# Patient Record
Sex: MALE | Race: WHITE | Employment: OTHER | URBAN - METROPOLITAN AREA
[De-identification: names, ages, dates, MRNs, and addresses within clinical notes are randomized per-mention and may not be internally consistent; named-entity substitution may affect disease eponyms.]

---

## 2017-04-03 ENCOUNTER — IP HISTORICAL/CONVERTED ENCOUNTER (OUTPATIENT)
Dept: OTHER | Age: 82
End: 2017-04-03

## 2017-04-22 ENCOUNTER — OP HISTORICAL/CONVERTED ENCOUNTER (OUTPATIENT)
Dept: OTHER | Age: 82
End: 2017-04-22

## 2017-05-31 ENCOUNTER — TELEPHONE (OUTPATIENT)
Dept: ENDOCRINOLOGY | Age: 82
End: 2017-05-31

## 2017-05-31 NOTE — TELEPHONE ENCOUNTER
Spoke with patient's wife, Merrill Ralph (HIPPA verified). She stated patient will have colonoscopy on 06/19/2017 and was instructed to call diabetic prescribing physician to find out dosing instructions for medications before beginning a clear liquid diet. Please advise.

## 2017-06-01 NOTE — TELEPHONE ENCOUNTER
She is not on any meds that could cause low sugars    But he could stop both [place] and [place] and metformin the day before colonoscopy and day of procedure   Start next day back Luann

## 2017-06-13 ENCOUNTER — TELEPHONE (OUTPATIENT)
Dept: ENDOCRINOLOGY | Age: 82
End: 2017-06-13

## 2017-06-13 NOTE — TELEPHONE ENCOUNTER
Patients wife would like to clarify directions on medications stop and start for his procedure on Monday the 19th of June. Please leave a message if she does not .  Thank you

## 2017-06-13 NOTE — TELEPHONE ENCOUNTER
Spoke with patient's wife and told her to stop the diabetic medication the day prior to procedure and the day of procedure and resume the day after procedure. Verbalized understanding. No further questions noted at this time.

## 2017-06-15 NOTE — H&P
Patient Name: Robert Macedo   Account #: [id number]    Gender: Male    (age): 1935 ([de-identified])       Provider:     Reyna Pond MD        Referring Physician:          Chief Complaint: anemia           History of Present Illness: The patient's laboratory evaluation thus far has included BMP. Results were normal creatinine. The patient's laboratory evaluation thus far has included ferritin. Results were abnormal; 19. Robert Macedo is seen today for a follow-up visit. The patient is seen for evaluation of anemia. Initially diagnosed with anemia 4 weeks ago. Potential reasons already identified include iron deficiency. Hemoccult testing has been not completed. Blood has not been noted in the stool. The patient has required oral iron tablets for replacement therapy. Associated symptoms include none easy fatigability. Has had prior no prior gastric surgeries. The patient's laboratory evaluation thus far has included hemoglobin. Results were 7.5. Past Medical History      Medical Conditions: Anemia  Arthritis  Congestive Heart Failure  Diabetes Mellitus  High blood pressure  Name of blood thinner:____aspirin ___   Surgical Procedures:  Other major surgeries:, HERNIA REPAIR/ BOTH KNEE REPLACEMENTS/SHOULDER   Dx Studies: Abdominal U/S  CAT Scan, 2 yrs ago  Colonoscopy, 8 yrs ago  Pre-Procedure Call, 2016   Medications: Adult Low Dose Aspirin 81 mg  carbidopa-levodopa  mg  clonidine HCl 0.1 mg  finasteride 5 mg  hydrocodone-acetaminophen 5-325 mg  losartan 50 mg  metformin 500 mg  omeprazole 20 mg Take 1 capsule by mouth twice a day before meals for 30 days  polyethylene glycol 3350 NF 17 gram/dose Take 17 gram by mouth twice a day with 12 oz glass of water for 30 days  potassium chloride 10 mEq  ropinirole 5 mg  tamsulosin 0.4 mg  tramadol 50 mg  Tums 200 mg calcium (500 mg)   Allergies: Patient has no known allergies or drug allergies   Immunizations: Flu vaccine, 10/2012  zoster, 10/2011  Pneumococcal conjugate PCV 13, 5 yrs ago      Social History      Alcohol: None   Tobacco: Never smoker   Drugs: None   Exercise: [employer]. Caffeine: 2-3 cups. Coffee or Tea # of cups per day:           Family History       Review of Systems:   Cardiovascular: Presents suffers from peripheral edema. Denies chest pain, irregular heart beat, palpitations, syncope, Sweats. Constitutional: Denies fatigue, fever, loss of appetite, weight loss. ENMT: Denies nose bleeds, sore throat. Endocrine: Denies excessive thirst, heat intolerance. Gastrointestinal: Denies abdominal pain, abdominal swelling, change in bowel habits, constipation, diarrhea, Bloating/gas, heartburn, jaundice, nausea, rectal bleeding, stomach cramps, vomiting, dysphagia, rectal pain, Stool incontinence. Genitourinary: Denies dark urine, dysuria, frequent urination, hematuria, incontinence. Hematologic/Lymphatic: Denies easy bruising, prolonged bleeding. Integumentary: Denies itching, rashes, sun sensitivity. Musculoskeletal: Denies arthritis, back pain, gout, joint pain, muscle weakness, stiffness. Neurological: Denies dizziness, fainting, frequent headaches, memory loss. Psychiatric: Denies anxiety, depression, difficulty sleeping, hallucinations, nervousness, panic attacks, paranoia. Respiratory: Presents suffers from shortness of breath with exercise. Denies cough, wheezing. Vital Signs: See RN notes      Physical Exam:   Constitutional:      Appearance: No distress, appears comfortable. Skin:      Inspection: No rash, no jaundice. Head/face: Inspection: Normacephalic, atraumatic. Eyes:      Conjunctivae/lids: Normal.   ENMT:      External: Normal.   Neck:      Neck: Normal appearance, trachea midline. Respiratory:      Effort: Normal respiratory effort, comfortable, speaks in complete sentences. Auscultation: normal breath sounds, no rubs, wheezes or rhonchi.    Cardiovascular: Auscultation: normal, S1 and S2, no gallops , no rubs or murmurs . Gastrointestinal/Abdomen:      Abdomen: non-distended, nontender. Liver/Spleen: normal, normal size, Liver size and consistency normal, spleen is non-palpable. Musculoskeletal:      Gait/station: normal.   Muscles: normal strength and tone, no atrophy or abnormal movements. Psychiatric:      Judgment/insight: Normal, normal judgement, normal insight. Mood and affect: Normal mood, affect full, no evidence of depression, anxiety or agitation. Lymphatic:      Neck: No lymphadenopathy in the cervical or supraclavicular chain. Lab Results:      Test 8/25/2016 Units Limits   Ferritin, Serum      Ferritin, Serum 25 ng/mL 30 - 400   Vitamin B12      Vitamin B12 432 pg/mL 211 - 946     Impressions: Iron deficiency anemia, unspecified     Plan: I've discussed colonoscopy possible biopsy, polypectomy, cautery, injection, alternatives, complications including but not limited to pain, cardiopulmonary event, bleeding, perforation requiring additional blood transfusion or operative repair; all questions answered.

## 2017-06-19 ENCOUNTER — HOSPITAL ENCOUNTER (OUTPATIENT)
Age: 82
Setting detail: OUTPATIENT SURGERY
Discharge: HOME | End: 2017-06-19
Attending: INTERNAL MEDICINE | Admitting: INTERNAL MEDICINE
Payer: MEDICARE

## 2017-06-19 ENCOUNTER — ANESTHESIA EVENT (OUTPATIENT)
Dept: ENDOSCOPY | Age: 82
End: 2017-06-19
Payer: MEDICARE

## 2017-06-19 ENCOUNTER — ANESTHESIA (OUTPATIENT)
Dept: ENDOSCOPY | Age: 82
End: 2017-06-19
Payer: MEDICARE

## 2017-06-19 VITALS
DIASTOLIC BLOOD PRESSURE: 81 MMHG | WEIGHT: 140 LBS | BODY MASS INDEX: 23.9 KG/M2 | HEART RATE: 62 BPM | HEIGHT: 64 IN | OXYGEN SATURATION: 100 % | RESPIRATION RATE: 17 BRPM | TEMPERATURE: 97.8 F | SYSTOLIC BLOOD PRESSURE: 163 MMHG

## 2017-06-19 LAB
GLUCOSE BLD STRIP.AUTO-MCNC: 123 MG/DL (ref 65–100)
SERVICE CMNT-IMP: ABNORMAL

## 2017-06-19 PROCEDURE — 74011250636 HC RX REV CODE- 250/636

## 2017-06-19 PROCEDURE — 76040000019: Performed by: INTERNAL MEDICINE

## 2017-06-19 PROCEDURE — 74011250636 HC RX REV CODE- 250/636: Performed by: INTERNAL MEDICINE

## 2017-06-19 PROCEDURE — 76060000031 HC ANESTHESIA FIRST 0.5 HR: Performed by: INTERNAL MEDICINE

## 2017-06-19 PROCEDURE — 82962 GLUCOSE BLOOD TEST: CPT

## 2017-06-19 RX ORDER — DEXTROMETHORPHAN/PSEUDOEPHED 2.5-7.5/.8
1.2 DROPS ORAL
Status: DISCONTINUED | OUTPATIENT
Start: 2017-06-19 | End: 2017-06-19 | Stop reason: HOSPADM

## 2017-06-19 RX ORDER — EPINEPHRINE 0.1 MG/ML
1 INJECTION INTRACARDIAC; INTRAVENOUS
Status: DISCONTINUED | OUTPATIENT
Start: 2017-06-19 | End: 2017-06-19 | Stop reason: HOSPADM

## 2017-06-19 RX ORDER — MIDAZOLAM HYDROCHLORIDE 1 MG/ML
.25-5 INJECTION, SOLUTION INTRAMUSCULAR; INTRAVENOUS
Status: DISCONTINUED | OUTPATIENT
Start: 2017-06-19 | End: 2017-06-19 | Stop reason: HOSPADM

## 2017-06-19 RX ORDER — FLUMAZENIL 0.1 MG/ML
0.2 INJECTION INTRAVENOUS
Status: DISCONTINUED | OUTPATIENT
Start: 2017-06-19 | End: 2017-06-19 | Stop reason: HOSPADM

## 2017-06-19 RX ORDER — PROPOFOL 10 MG/ML
INJECTION, EMULSION INTRAVENOUS
Status: DISCONTINUED | OUTPATIENT
Start: 2017-06-19 | End: 2017-06-19 | Stop reason: HOSPADM

## 2017-06-19 RX ORDER — PROPOFOL 10 MG/ML
INJECTION, EMULSION INTRAVENOUS AS NEEDED
Status: DISCONTINUED | OUTPATIENT
Start: 2017-06-19 | End: 2017-06-19 | Stop reason: HOSPADM

## 2017-06-19 RX ORDER — FERROUS GLUCONATE 324(38)MG
TABLET ORAL
COMMUNITY

## 2017-06-19 RX ORDER — FENTANYL CITRATE 50 UG/ML
100 INJECTION, SOLUTION INTRAMUSCULAR; INTRAVENOUS
Status: DISCONTINUED | OUTPATIENT
Start: 2017-06-19 | End: 2017-06-19 | Stop reason: HOSPADM

## 2017-06-19 RX ORDER — NALOXONE HYDROCHLORIDE 0.4 MG/ML
0.4 INJECTION, SOLUTION INTRAMUSCULAR; INTRAVENOUS; SUBCUTANEOUS
Status: DISCONTINUED | OUTPATIENT
Start: 2017-06-19 | End: 2017-06-19 | Stop reason: HOSPADM

## 2017-06-19 RX ORDER — SODIUM CHLORIDE 9 MG/ML
50 INJECTION, SOLUTION INTRAVENOUS CONTINUOUS
Status: DISCONTINUED | OUTPATIENT
Start: 2017-06-19 | End: 2017-06-19 | Stop reason: HOSPADM

## 2017-06-19 RX ORDER — ATROPINE SULFATE 0.1 MG/ML
0.5 INJECTION INTRAVENOUS
Status: DISCONTINUED | OUTPATIENT
Start: 2017-06-19 | End: 2017-06-19 | Stop reason: HOSPADM

## 2017-06-19 RX ADMIN — SODIUM CHLORIDE: 900 INJECTION, SOLUTION INTRAVENOUS at 09:08

## 2017-06-19 RX ADMIN — PROPOFOL 100 MCG/KG/MIN: 10 INJECTION, EMULSION INTRAVENOUS at 09:18

## 2017-06-19 RX ADMIN — PROPOFOL 50 MG: 10 INJECTION, EMULSION INTRAVENOUS at 09:18

## 2017-06-19 NOTE — ANESTHESIA PREPROCEDURE EVALUATION
Anesthetic History   No history of anesthetic complications       Comments: Low BP with epidural     Review of Systems / Medical History  Patient summary reviewed, nursing notes reviewed and pertinent labs reviewed    Pulmonary  Within defined limits                 Neuro/Psych   Within defined limits          Comments: Left side weak   parkinson Cardiovascular    Hypertension      CHF        Exercise tolerance: >4 METS  Comments:        GI/Hepatic/Renal     GERD: well controlled           Endo/Other    Diabetes: well controlled, type 2    Arthritis     Other Findings              Physical Exam    Airway  Mallampati: II    Neck ROM: normal range of motion   Mouth opening: Normal     Cardiovascular  Regular rate and rhythm,  S1 and S2 normal,  no murmur, click, rub, or gallop  Rhythm: regular  Rate: normal         Dental    Dentition: Lower partial plate     Pulmonary  Breath sounds clear to auscultation               Abdominal         Other Findings            Anesthetic Plan    ASA: 3  Anesthesia type: MAC            Anesthetic plan and risks discussed with: Patient      Informed consent obtained.

## 2017-06-19 NOTE — ANESTHESIA POSTPROCEDURE EVALUATION
Post-Anesthesia Evaluation and Assessment    Patient: Neeru Angela MRN: [id number]  SSN: [id number]    YOB: 1935  Age: 81 y.o. Sex: male       Cardiovascular Function/Vital Signs  Visit Vitals    /84    Pulse 63    Resp 13    Ht 5' 4\" (1.626 m)    Wt 63.5 kg (140 lb)    SpO2 100%    BMI 24.03 kg/m2       Patient is status post MAC anesthesia for Procedure(s):  COLONOSCOPY. Nausea/Vomiting: None    Postoperative hydration reviewed and adequate. Pain:  Pain Scale 1: Visual (06/19/17 0945)  Pain Intensity 1: 0 (06/19/17 0945)   Managed    Neurological Status: At baseline    Mental Status and Level of Consciousness: Arousable    Pulmonary Status:   O2 Device: Room air (06/19/17 0945)   Adequate oxygenation and airway patent    Complications related to anesthesia: None    Post-anesthesia assessment completed.  No concerns    Signed By: Sylvia Ocampo DO     June 19, 2017

## 2017-06-19 NOTE — PERIOP NOTES
Pt resting comfortably on stretcher HOB up VSS call bell within reach wife at bedside awaiting MD for procedure will continue to monitor pt.
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
## 2017-06-19 NOTE — DISCHARGE INSTRUCTIONS
Mercedez Knowles  [id number]  1935    DISCHARGE INSTRUCTIONS  Discomfort:  Redness at IV site- apply warm compress to area; if redness or soreness persist- contact your physician. There may be a slight amount of blood passed from the rectum. Gaseous discomfort - walking, belching will help relieve any discomfort. You may not operate a vehicle for 12 hours. You may not engage in an occupation involving machinery or appliances for rest of today. You may not drink alcoholic beverages for at least 12 hours. Avoid making any critical decisions for at least 24 hours. DIET:   High fiber diet. - however -  remember your colon is empty and a heavy meal will produce gas. Avoid these foods:  vegetables, fried / greasy foods, carbonated drinks for today. ACTIVITY:  You may resume your normal daily activities it is recommended that you spend the remainder of the day resting -  avoid any strenuous activity. CALL M.D. ANY SIGN OF:   Increasing pain, nausea, vomiting  Abdominal distension (swelling)  New increased bleeding (oral or rectal)  Fever (chills)  Pain in chest area  Bloody discharge from nose or mouth  Shortness of breath     Follow-up Instructions:  Call Dr. Tommie Lewis if you have any questions or problems.   Follow up blood testing for anemia next week        DISCHARGE SUMMARY from Nurse    The following personal items collected during your admission are returned to you:   Dental Appliance: Dental Appliances: None  Vision: Visual Aid: None  Hearing Aid:    Jewelry:    Clothing:    Other Valuables:    Valuables sent to safe:

## 2017-06-19 NOTE — PROCEDURES
[address]  [phone]      2017    Colonoscopy Procedure Note  Radha Iniguez  :  1935  [place] Medical Record Number: [id number]    Indications:     Iron deficiency anemia  PCP:  Inna Navarrete MD  Anesthesia/Sedation: Conscious Sedation/Moderate Sedation  Endoscopist:  Dr. David Jarrell  Complications:  None  Estimate Blood Loss:  None    Permit:  The indications, risks, benefits and alternatives were reviewed with the patient or their decision maker who was provided an opportunity to ask questions and all questions were answered. The specific risks of colonoscopy with conscious sedation were reviewed, including but not limited to anesthetic complication, bleeding, adverse drug reaction, missed lesion, infection, IV site reactions, and intestinal perforation which would lead to the need for surgical repair. Alternatives to colonoscopy including radiographic imaging, observation without testing, or laboratory testing were reviewed including the limitations of those alternatives. After considering the options and having all their questions answered, the patient or their decision maker provided both verbal and written consent to proceed. Procedure in Detail:  After obtaining informed consent, positioning of the patient in the left lateral decubitus position, and conduction of a pre-procedure pause or \"time out\" the endoscope was introduced into the anus and advanced to the cecum, which was identified by the ileocecal valve and appendiceal orifice. The quality of the colonic preparation was inadequate with significant areas obscured by mud consistency residual that can not be aspirated through scope. A careful inspection was made as the colonoscope was withdrawn, findings and interventions are described below.     Appendiceal orifice photographed    Findings:   no mucosal lesion appreciated      - Diverticulosis    Specimens:    none    Complications:   None; patient tolerated the procedure well. Estimated blood loss: none    Impression:  colonic diverticulosis; preparation not adequate exclude significant pathology    Recommendations:      - because of age no routine follow up colon exam planned   CBC next week    Thank you for entrusting me with this patient's care. Please do not hesitate to contact me with any questions or if I can be of assistance with any of your other patients' GI needs.     Signed By: Bard Peter MD                        June 19, 2017

## 2017-07-28 ENCOUNTER — TELEPHONE (OUTPATIENT)
Dept: ENDOCRINOLOGY | Age: 82
End: 2017-07-28

## 2017-07-28 DIAGNOSIS — E11.65 TYPE 2 DIABETES MELLITUS WITH HYPERGLYCEMIA, WITHOUT LONG-TERM CURRENT USE OF INSULIN (HCC): Primary | ICD-10-CM

## 2017-08-03 ENCOUNTER — HOSPITAL ENCOUNTER (OUTPATIENT)
Dept: LAB | Age: 82
Discharge: HOME | End: 2017-08-03
Payer: MEDICARE

## 2017-08-03 ENCOUNTER — LAB ONLY (OUTPATIENT)
Dept: ENDOCRINOLOGY | Age: 82
End: 2017-08-03

## 2017-08-03 DIAGNOSIS — E11.65 TYPE 2 DIABETES MELLITUS WITH HYPERGLYCEMIA, WITHOUT LONG-TERM CURRENT USE OF INSULIN (HCC): ICD-10-CM

## 2017-08-03 PROCEDURE — 82043 UR ALBUMIN QUANTITATIVE: CPT

## 2017-08-03 PROCEDURE — 80053 COMPREHEN METABOLIC PANEL: CPT

## 2017-08-03 PROCEDURE — 83036 HEMOGLOBIN GLYCOSYLATED A1C: CPT

## 2017-08-03 PROCEDURE — 80061 LIPID PANEL: CPT

## 2017-08-04 LAB
ALBUMIN SERPL-MCNC: 4.3 G/DL (ref 3.5–4.7)
ALBUMIN/CREAT UR: 86.7 MG/G CREAT (ref 0–30)
ALBUMIN/GLOB SERPL: 1.5 {RATIO} (ref 1.2–2.2)
ALP SERPL-CCNC: 94 IU/L (ref 39–117)
ALT SERPL-CCNC: 8 IU/L (ref 0–44)
AST SERPL-CCNC: 23 IU/L (ref 0–40)
BILIRUB SERPL-MCNC: 0.3 MG/DL (ref 0–1.2)
BUN SERPL-MCNC: 15 MG/DL (ref 8–27)
BUN/CREAT SERPL: 18 (ref 10–24)
CALCIUM SERPL-MCNC: 9.6 MG/DL (ref 8.6–10.2)
CHLORIDE SERPL-SCNC: 97 MMOL/L (ref 96–106)
CHOLEST SERPL-MCNC: 163 MG/DL (ref 100–199)
CO2 SERPL-SCNC: 27 MMOL/L (ref 18–29)
CREAT SERPL-MCNC: 0.84 MG/DL (ref 0.76–1.27)
CREAT UR-MCNC: 54.8 MG/DL
EST. AVERAGE GLUCOSE BLD GHB EST-MCNC: 177 MG/DL
GLOBULIN SER CALC-MCNC: 2.8 G/DL (ref 1.5–4.5)
GLUCOSE SERPL-MCNC: 184 MG/DL (ref 65–99)
HBA1C MFR BLD: 7.8 % (ref 4.8–5.6)
HDLC SERPL-MCNC: 50 MG/DL
INTERPRETATION, 910389: NORMAL
LDLC SERPL CALC-MCNC: 91 MG/DL (ref 0–99)
Lab: NORMAL
MICROALBUMIN UR-MCNC: 47.5 UG/ML
POTASSIUM SERPL-SCNC: 4.5 MMOL/L (ref 3.5–5.2)
PROT SERPL-MCNC: 7.1 G/DL (ref 6–8.5)
SODIUM SERPL-SCNC: 140 MMOL/L (ref 134–144)
TRIGL SERPL-MCNC: 108 MG/DL (ref 0–149)
VLDLC SERPL CALC-MCNC: 22 MG/DL (ref 5–40)

## 2017-08-10 ENCOUNTER — OFFICE VISIT (OUTPATIENT)
Dept: ENDOCRINOLOGY | Age: 82
End: 2017-08-10

## 2017-08-10 VITALS
HEIGHT: 64 IN | BODY MASS INDEX: 23.22 KG/M2 | RESPIRATION RATE: 18 BRPM | WEIGHT: 136 LBS | HEART RATE: 78 BPM | TEMPERATURE: 97.9 F | SYSTOLIC BLOOD PRESSURE: 111 MMHG | DIASTOLIC BLOOD PRESSURE: 71 MMHG

## 2017-08-10 DIAGNOSIS — E78.2 MIXED HYPERLIPIDEMIA: ICD-10-CM

## 2017-08-10 DIAGNOSIS — I10 ESSENTIAL HYPERTENSION: ICD-10-CM

## 2017-08-10 DIAGNOSIS — E11.65 UNCONTROLLED TYPE 2 DIABETES MELLITUS WITH HYPERGLYCEMIA, WITHOUT LONG-TERM CURRENT USE OF INSULIN (HCC): Primary | ICD-10-CM

## 2017-08-10 RX ORDER — TRIAMCINOLONE ACETONIDE 1 MG/G
CREAM TOPICAL
Refills: 0 | COMMUNITY
Start: 2017-08-02 | End: 2020-07-07 | Stop reason: ALTCHOICE

## 2017-08-10 RX ORDER — OMEPRAZOLE 20 MG/1
CAPSULE, DELAYED RELEASE ORAL
Refills: 11 | COMMUNITY
Start: 2017-05-26 | End: 2020-07-07 | Stop reason: DRUGHIGH

## 2017-08-10 RX ORDER — FUROSEMIDE 40 MG/1
TABLET ORAL
Refills: 4 | COMMUNITY
Start: 2017-05-25

## 2017-08-10 NOTE — PROGRESS NOTES
Wt Readings from Last 3 Encounters:   08/10/17 136 lb (61.7 kg)   06/19/17 140 lb (63.5 kg)   09/13/16 150 lb (68 kg)     Temp Readings from Last 3 Encounters:   08/10/17 97.9 °F (36.6 °C) (Oral)   06/19/17 97.8 °F (36.6 °C)   09/13/16 97.8 °F (36.6 °C)     BP Readings from Last 3 Encounters:   08/10/17 111/71   06/19/17 163/81   09/13/16 171/90     Pulse Readings from Last 3 Encounters:   08/10/17 78   06/19/17 62   09/13/16 68     Lab Results   Component Value Date/Time    Hemoglobin A1c 7.8 08/03/2017 02:02 PM    Hemoglobin A1c (POC) 6.2 05/27/2014 01:26 PM    Hemoglobin A1c, External 6.9 07/17/2016     Order placed for pt,  per Verbal Order with read back from Dr Poonam Palma on 8/10/2017.

## 2017-08-10 NOTE — PROGRESS NOTES
HISTORY OF PRESENT ILLNESS   Elvira Parry is a 81 y.o. male. HPI   One year F/u for DM 2 after Aug  2016     Lost 4  lbs   Accompanied by wife     Seeing doctors for parkinson's disease, had some cardiac issues attended to   He has head tremors     hospitalized in April 2017 for CHF      No hypoglycemias   He is awake today       Review of Systems   Constitutional: Negative. HENT: Negative. Eyes: Negative for pain and redness. Respiratory: Negative. Cardiovascular: Negative for chest pain, palpitations and leg swelling. Gastrointestinal: Negative. Negative for constipation. Genitourinary: Negative. Musculoskeletal: Negative for myalgias. Skin: Negative. Neurological: Negative. Endo/Heme/Allergies: Negative. Psychiatric/Behavioral: Negative for depression and memory loss. The patient does not have insomnia. Physical Exam   Constitutional: He is oriented to person, place, and time. He appears well-developed and well-nourished. HENT:   Head: Normocephalic. Eyes: Conjunctivae and extraocular motions are normal. Pupils are equal, round, and reactive to light. Neck: Normal range of motion. Neck supple. No JVD present. No tracheal deviation present. No thyromegaly present. Cardiovascular: Normal rate, regular rhythm and normal heart sounds. Pulmonary/Chest: Effort normal and breath sounds normal.   Abdominal: Soft. Bowel sounds are normal.   Musculoskeletal: Normal range of motion. Lymphadenopathy:   He has no cervical adenopathy. Neurological: He is alert and oriented to person, place, and time. He has normal reflexes. Skin: Skin is warm. Psychiatric: He has a normal mood and affect.      Foot exam : sees Dr. 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       Lab Results   Component Value Date/Time    Hemoglobin A1c 7.8 08/03/2017 02:02 PM    Hemoglobin A1c 7.2 02/12/2016 09:42 AM    Hemoglobin A1c 6.8 01/29/2015 08:52 AM    Hemoglobin A1c, External 6.9 07/17/2016    Glucose 184 08/03/2017 02:02 PM    Glucose (POC) 123 06/19/2017 08:07 AM    Microalb/Creat ratio (ug/mg creat.) 86.7 08/03/2017 02:02 PM    LDL, calculated 91 08/03/2017 02:02 PM    Creatinine 0.84 08/03/2017 02:02 PM      Lab Results   Component Value Date/Time    Cholesterol, total 163 08/03/2017 02:02 PM    HDL Cholesterol 50 08/03/2017 02:02 PM    LDL, calculated 91 08/03/2017 02:02 PM    Triglyceride 108 08/03/2017 02:02 PM    CHOL/HDL Ratio 3.5 10/05/2010 06:53 AM       Lab Results   Component Value Date/Time    ALT (SGPT) 8 08/03/2017 02:02 PM    AST (SGOT) 23 08/03/2017 02:02 PM    Alk. phosphatase 94 08/03/2017 02:02 PM    Bilirubin, total 0.3 08/03/2017 02:02 PM       Lab Results   Component Value Date/Time    GFR est AA 95 08/03/2017 02:02 PM    GFR est non-AA 82 08/03/2017 02:02 PM    Creatinine 0.84 08/03/2017 02:02 PM    BUN 15 08/03/2017 02:02 PM    Sodium 140 08/03/2017 02:02 PM    Potassium 4.5 08/03/2017 02:02 PM    Chloride 97 08/03/2017 02:02 PM    CO2 27 08/03/2017 02:02 PM          ASSESSMENT and PLAN     1. DM 2 uncontrolled : A1c is   7.8 %      From   Aug 2017   compared to    6.9 %  From  July 2016   Compared to   7.2 %    From feb 2016  Compared to    6.8 %   From feb 2015 compared to   6.2 %  Compared to 7.8 % from feb 2014 compared to  5.4 % from July 2013 compared to  7 % from jan 2013 ;  compared to A1c is 6.7 % in June 2012 8.3 % from April 2012 compared to 7.3 % from 12/2011       Losing  glycemic control slowly , but by his age and co-morbidities, he has a1c at goal   Off glipizide at low dose is 2.5 mg bid as he had hypoglycemias ,   continuing on Metformin , Januvia 100 mg a day.      Also, mentioned that Saint Norm and [place] and metformin are safer meds for not having low sugars over glipizide   To continue on metformin and Saint Norm and Stephanie     Given the weight loss,   Lowered  the dose to 1 pill of metformin 500 mg with meal         2. HTN : too well controlled,  on hyzaar 100/12.5 mg  a day. detectable  microalb in urine     3. HPL : not on statins but LDL is border line high - 109  Given his age, it is ok to defer it     4. Osteoporosis : normal DEXa in June 2012   Date : June 2012  Bone DEXA  ap spine T-score 1.9; left femoral Neck T- score  -1.8, right femoral neck T-score-1.6      5. Parkinsons disease-- on carbidopa      6.  GI eval  Revealed normal colon         Did the comprehensive foot exam today   I am treating the patient [facility] comprehensive plan of care for diabetes   The patient would benefit from diabetic foot wear to protect their feet       > 50 % of time is spent on counseling         F/u in a year

## 2017-08-10 NOTE — MR AVS SNAPSHOT
Visit Information Date & Time Provider Department Dept. Phone Encounter #  
 8/10/2017  1:30 PM Cathy Rodriges MD [facility] [phone] [id number] Follow-up Instructions Return in about 1 year (around 8/10/2018). Upcoming Health Maintenance Date Due  
 EYE EXAM RETINAL OR DILATED Q1 5/3/1946 DTaP/Tdap/Td series (1 - Tdap) 5/3/1957 ZOSTER VACCINE AGE 60> 3/3/1996 GLAUCOMA SCREENING Q2Y 5/3/2001 Pneumococcal 65+ Low/Medium Risk (1 of 2 - PCV13) 5/3/2001 MEDICARE YEARLY EXAM 5/3/2001 FOOT EXAM Q1 12/11/2015 INFLUENZA AGE 9 TO ADULT 8/1/2017 HEMOGLOBIN A1C Q6M 2/3/2018 MICROALBUMIN Q1 8/3/2018 LIPID PANEL Q1 8/3/2018 Allergies as of 8/10/2017  Review Complete On: 8/10/2017 By: Cathy Rodriges MD  
  
 Severity Noted Reaction Type Reactions Ferrous Sulfate  06/19/2017    Rash  
 Sulfate Salt  06/19/2017    Rash Current Immunizations  Reviewed on 7/17/2012 No immunizations on file. Not reviewed this visit You Were Diagnosed With   
  
 Codes Comments Uncontrolled type 2 diabetes mellitus with hyperglycemia, without long-term current use of insulin ([facility]    -  Primary ICD-10-CM: E11.65 ICD-9-CM: 250.02 Vitals BP Pulse Temp Resp Height(growth percentile) Weight(growth percentile) 111/71 (BP 1 Location: Left arm, BP Patient Position: Sitting) 78 97.9 °F (36.6 °C) (Oral) 18 5' 4\" (1.626 m) 136 lb (61.7 kg) BMI Smoking Status 23.34 kg/m2 Never Smoker BMI and BSA Data Body Mass Index Body Surface Area  
 23.34 kg/m 2 1.67 m 2 Preferred Pharmacy Pharmacy Name Phone [facility] [address] [phone] Your Updated Medication List  
  
   
This list is accurate as of: 8/10/17  3:43 PM.  Always use your most recent med list.  
  
  
  
  
 Carmen Turkmen Generic drug: Blood-Glucose Meter  
by Does Not Apply route. acetaminophen 500 mg tablet Commonly known as:  TYLENOL Take  by mouth every six (6) hours as needed. alfuzosin SR 10 mg SR tablet Commonly known as:  Merl Amor Take  by mouth daily. amoxicillin 500 mg capsule Commonly known as:  AMOXIL Take 500 mg by mouth as directed. 1 hr prior to dental procedures. aspirin 81 mg tablet Take 81 mg by mouth.  
  
 carbidopa-levodopa  mg per tablet Commonly known as:  SINEMET Take 1 Tab by mouth three (3) times daily. 2/2/1  
  
 carvedilol 3.125 mg tablet Commonly known as:  Deleta Shena Take  by mouth two (2) times daily (with meals). CLEAR EYES REDNESS RELIEF OP Apply  to eye as needed. cloNIDine HCl 0.1 mg tablet Commonly known as:  CATAPRES Take 0.1 mg by mouth as needed. Takes prior to Dental appointment and PRN for HTN if systolic is above 648  
  
 ferrous gluconate 324 mg (38 mg iron) tablet Take  by mouth Daily (before breakfast). finasteride 5 mg tablet Commonly known as:  PROSCAR  
  
 furosemide 40 mg tablet Commonly known as:  LASIX TK 1 T PO D  
  
 * glucose blood VI test strips strip Commonly known as:  ACCU-CHEK SMARTVIEW TEST STRIP  
USE TO TEST BLOOD SUGAR TWICE DAILY. DX CODE 250.00 * ACCU-CHEK SMARTVIEW TEST STRIP strip Generic drug:  glucose blood VI test strips USE TO CHECK BLOOD SUGAR TWICE DAILY  
  
 KLOR-CON M10 10 mEq tablet Generic drug:  potassium chloride Take 10 mEq by mouth daily. Lancets Misc  
100 Packages by Does Not Apply route two (2) times a day. losartan-hydroCHLOROthiazide 100-12.5 mg per tablet Commonly known as:  HYZAAR Take 1 Tab by mouth daily. M-VIT PO Take  by mouth.  
  
 metFORMIN 500 mg tablet Commonly known as:  GLUCOPHAGE  
TAKE 2 TABLETS BY MOUTH TWICE DAILY WITH MEALS  
  
 * omeprazole 20 mg capsule Commonly known as:  PRILOSEC Take 1 Cap by mouth Before breakfast and dinner for 30 days. * omeprazole 20 mg capsule Commonly known as:  PRILOSEC TK 1 C PO B RYANNE AND DINNER Polyethylene Glycol 3350 Powd  
by Does Not Apply route daily. rOPINIRole 5 mg tablet Commonly known as:  Alex Swansonet Take 5 mg by mouth daily. SITagliptin 100 mg tablet Commonly known as:  Maxime Cali TAKE 1 TABLET BY MOUTH DAILY  
  
 traMADol 50 mg tablet Commonly known as:  ULTRAM  
Take 50 mg by mouth every six (6) hours as needed. triamcinolone acetonide 0.1 % topical cream  
Commonly known as:  KENALOG  
XIOMARA EXT AA BID FOR 1 TO 2 WKS PRN  
  
 TUMS ULTRA 400 mg calcium (1,000 mg) Chew Generic drug:  calcium carbonate Take  by mouth as needed. * Notice: This list has 4 medication(s) that are the same as other medications prescribed for you. Read the directions carefully, and ask your doctor or other care provider to review them with you. Follow-up Instructions Return in about 1 year (around 8/10/2018). Patient Instructions Check blood sugars only once a day by rotation as follows First day - before b-fast 
Second day- before lunch Third day- before dinner Fourth day before bed time After 4 days go back to same rotation Decrease Metformin 500 mg one  pill twice a day with meals  
 
januvia 100 mg a day Introducing [facility]! [facility] introduces Ygle patient portal. Now you can access parts of your medical record, email your doctor's office, and request medication refills online. 1. In your internet browser, go to [email or web address] 2. Click on the First Time User? Click Here link in the Sign In box. You will see the New Member Sign Up page. 3. Enter your Ygle Access Code exactly as it appears below. You will not need to use this code after youve completed the sign-up process. If you do not sign up before the expiration date, you must request a new code.  
 
· EyeScribes Access Code: [email or web address] Expires: 9/17/2017  7:36 AM 
 
4. Enter the last four digits of your Social Security Number (xxxx) and Date of Birth (mm/dd/yyyy) as indicated and click Submit. You will be taken to the next sign-up page. 5. Create a EyeScribes ID. This will be your EyeScribes login ID and cannot be changed, so think of one that is secure and easy to remember. 6. Create a EyeScribes password. You can change your password at any time. 7. Enter your Password Reset Question and Answer. This can be used at a later time if you forget your password. 8. Enter your e-mail address. You will receive e-mail notification when new information is available in [address] 9. Click Sign Up. You can now view and download portions of your medical record. 10. Click the Download Summary menu link to download a portable copy of your medical information. If you have questions, please visit the Frequently Asked Questions section of the EyeScribes website. Remember, EyeScribes is NOT to be used for urgent needs. For medical emergencies, dial 911. Now available from your iPhone and Android! Please provide this summary of care documentation to your next provider. Your primary care clinician is listed as Osbaldo Amanda. If you have any questions after today's visit, please call [phone].

## 2017-08-10 NOTE — PATIENT INSTRUCTIONS
Check blood sugars only once a day by rotation as follows    First day - before b-fast  Second day- before lunch  Third day- before dinner  Fourth day before bed time    After 4 days go back to same rotation          Decrease Metformin 500 mg one  pill twice a day with meals     januvia 100 mg a day

## 2017-08-14 ENCOUNTER — TELEPHONE (OUTPATIENT)
Dept: ENDOCRINOLOGY | Age: 82
End: 2017-08-14

## 2017-08-14 NOTE — TELEPHONE ENCOUNTER
Patient wife,Sabine, says Dr, Colletta Basset wanted to speak with patient cardiologist. Harjeet Deluca would like to know if both doctor spoke and what was the outcome of the conversation.

## 2017-08-15 NOTE — TELEPHONE ENCOUNTER
Patient's wife called inquiring about conversation Solomon Velázquez had with cardiologist. Informed her Solomon Velázquez is out of office until Friday. She is requesting an update on this matter.

## 2017-08-21 NOTE — TELEPHONE ENCOUNTER
I reviewed his cardiology info   He has good  Ejection fraction, so am ok with him continuing on metformin

## 2017-08-22 ENCOUNTER — DOCUMENTATION ONLY (OUTPATIENT)
Dept: ENDOCRINOLOGY | Age: 82
End: 2017-08-22

## 2017-08-22 NOTE — TELEPHONE ENCOUNTER
He can use 2 pills twice a day ( a bit too strong of a dose for his age ) but this med will not give low sugars which are more serious

## 2017-08-22 NOTE — PROGRESS NOTES
April 4 2017 :    EF  48 %     Please tell wife that pt can stay on Metformin ( earlier I said to stop - error on my part)

## 2017-08-22 NOTE — TELEPHONE ENCOUNTER
Spoke with patient's wife and informed her patient can continue metformin due to ejection fraction being good. She would like clarification if patient is to continue on decreased amount of 500 mg one pill twice daily or go back to two pills twice daily. Please advise.

## 2017-08-23 NOTE — TELEPHONE ENCOUNTER
Informed patient's wife that patient can take 2 pills twice daily per . Verbalized understanding. No further questions noted at this time.

## 2017-09-19 ENCOUNTER — TELEPHONE (OUTPATIENT)
Dept: ENDOCRINOLOGY | Age: 82
End: 2017-09-19

## 2017-11-03 ENCOUNTER — OP HISTORICAL/CONVERTED ENCOUNTER (OUTPATIENT)
Dept: OTHER | Age: 82
End: 2017-11-03

## 2018-07-11 ENCOUNTER — OFFICE VISIT (OUTPATIENT)
Dept: ENDOCRINOLOGY | Age: 83
End: 2018-07-11

## 2018-07-11 VITALS
HEIGHT: 64 IN | BODY MASS INDEX: 21.72 KG/M2 | WEIGHT: 127.2 LBS | DIASTOLIC BLOOD PRESSURE: 67 MMHG | SYSTOLIC BLOOD PRESSURE: 112 MMHG | HEART RATE: 74 BPM | TEMPERATURE: 97.6 F | OXYGEN SATURATION: 96 % | RESPIRATION RATE: 18 BRPM

## 2018-07-11 DIAGNOSIS — I10 ESSENTIAL HYPERTENSION: ICD-10-CM

## 2018-07-11 DIAGNOSIS — G20 PARKINSON DISEASE (HCC): ICD-10-CM

## 2018-07-11 DIAGNOSIS — E11.65 UNCONTROLLED TYPE 2 DIABETES MELLITUS WITH HYPERGLYCEMIA, WITHOUT LONG-TERM CURRENT USE OF INSULIN (HCC): Primary | ICD-10-CM

## 2018-07-11 RX ORDER — LANOLIN ALCOHOL/MO/W.PET/CERES
1000 CREAM (GRAM) TOPICAL DAILY
COMMUNITY

## 2018-07-11 RX ORDER — LISINOPRIL 5 MG/1
TABLET ORAL
Refills: 4 | COMMUNITY
Start: 2018-05-17

## 2018-07-11 RX ORDER — POTASSIUM CHLORIDE 750 MG/1
TABLET, FILM COATED, EXTENDED RELEASE ORAL
Refills: 0 | COMMUNITY
Start: 2018-05-17 | End: 2018-07-11 | Stop reason: SDUPTHER

## 2018-07-11 RX ORDER — CARBIDOPA AND LEVODOPA 25; 100 MG/1; MG/1
9 TABLET ORAL DAILY
Refills: 5 | COMMUNITY
Start: 2018-06-21

## 2018-07-11 NOTE — MR AVS SNAPSHOT
[address] [id number] 
[phone] Patient: Joaquina Leonardo MRN: [id number] KHR:[birth year] Visit Information Date & Time Provider Department Dept. Phone Encounter #  
 7/11/2018 11:15 AM Anjana Aragon MD [facility] [phone] [id number] Follow-up Instructions Return in about 1 year (around 7/11/2019). Upcoming Health Maintenance Date Due  
 EYE EXAM RETINAL OR DILATED Q1 5[birth year]3/1945 DTaP/Tdap/Td series (1 - Tdap) 5/3/1957 ZOSTER VACCINE AGE 60> 3/3/1996 GLAUCOMA SCREENING Q2Y 5/3/2001 Pneumococcal 65+ Low/Medium Risk (1 of 2 - PCV13) 5/3/2001 FOOT EXAM Q1 12/11/2015 HEMOGLOBIN A1C Q6M 2/3/2018 MEDICARE YEARLY EXAM 3/14/2018 MICROALBUMIN Q1 8/3/2018 LIPID PANEL Q1 8/3/2018 Influenza Age 5 to Adult 8/1/2018 Allergies as of 7/11/2018  Review Complete On: 7/11/2018 By: Anjana Aragon MD  
  
 Severity Noted Reaction Type Reactions Ferrous Sulfate  06/19/2017    Rash  
 Sulfate Salt  06/19/2017    Rash Current Immunizations  Reviewed on 7/17/2012 No immunizations on file. Not reviewed this visit You Were Diagnosed With   
  
 Codes Comments Uncontrolled type 2 diabetes mellitus with hyperglycemia, without long-term current use of insulin ([facility]    -  Primary ICD-10-CM: E11.65 ICD-9-CM: 250.02 Essential hypertension     ICD-10-CM: I10 
ICD-9-CM: 401.9 Parkinson disease ([address]     ICD-10-CM: G20 
ICD-9-CM: 332.0 Vitals BP Pulse Temp Resp Height(growth percentile) Weight(growth percentile) 112/67 (BP 1 Location: Right arm, BP Patient Position: Sitting) 74 97.6 °F (36.4 °C) (Oral) 18 5' 4\" (1.626 m) 127 lb 3.2 oz (57.7 kg) SpO2 BMI Smoking Status 96% 21.83 kg/m2 Never Smoker BMI and BSA Data Body Mass Index Body Surface Area  
 21.83 kg/m 2 1.61 m 2 Preferred Pharmacy  Pharmacy Name Phone [facility] [address] [phone] Your Updated Medication List  
  
   
This list is accurate as of 7/11/18 12:25 PM.  Always use your most recent med list.  
  
  
  
  
 Stephanie Vera Generic drug:  Blood-Glucose Meter  
by Does Not Apply route. acetaminophen 500 mg tablet Commonly known as:  TYLENOL Take  by mouth every six (6) hours as needed. amoxicillin 500 mg capsule Commonly known as:  AMOXIL Take 500 mg by mouth as directed. 1 hr prior to dental procedures. aspirin 81 mg tablet Take 81 mg by mouth. Every third day  
  
 carbidopa-levodopa  mg per tablet Commonly known as:  SINEMET  
TK 2 TS PO FIVE TIMES A DAY FOR 30 DAYS  
  
 CLEAR EYES REDNESS RELIEF OP Apply  to eye as needed. cloNIDine HCl 0.1 mg tablet Commonly known as:  CATAPRES Take 0.1 mg by mouth as needed. Takes prior to Dental appointment and PRN for HTN if systolic is above 294  
  
 ferrous gluconate 324 mg (38 mg iron) tablet Take  by mouth Daily (before breakfast). finasteride 5 mg tablet Commonly known as:  PROSCAR  
  
 furosemide 40 mg tablet Commonly known as:  LASIX TK 1 T PO D  
  
 * glucose blood VI test strips strip Commonly known as:  ACCU-CHEK SMARTVIEW TEST STRIP  
USE TO TEST BLOOD SUGAR TWICE DAILY. DX CODE 250.00 * ACCU-CHEK SMARTVIEW TEST STRIP strip Generic drug:  glucose blood VI test strips USE TO CHECK BLOOD SUGAR TWICE DAILY  
  
 KLOR-CON M10 10 mEq tablet Generic drug:  potassium chloride Take 10 mEq by mouth daily. Lancets Misc  
100 Packages by Does Not Apply route two (2) times a day. lisinopril 5 mg tablet Commonly known as:  Stacey Primmer TK 1 T PO D  
  
 M-VIT PO Take  by mouth.  
  
 metFORMIN 500 mg tablet Commonly known as:  GLUCOPHAGE  
TAKE 2 TABLETS BY MOUTH TWICE DAILY WITH MEALS  
  
 * omeprazole 20 mg capsule Commonly known as:  PRILOSEC Take 1 Cap by mouth Before breakfast and dinner for 30 days. * omeprazole 20 mg capsule Commonly known as:  PRILOSEC TK 1 C PO B RYANNE AND DINNER Polyethylene Glycol 3350 Powd  
by Does Not Apply route daily. rOPINIRole 5 mg tablet Commonly known as:  Maya Bleak Take 5 mg by mouth daily. triamcinolone acetonide 0.1 % topical cream  
Commonly known as:  KENALOG  
XIOMARA EXT AA BID FOR 1 TO 2 WKS PRN  
  
 TUMS ULTRA 400 mg calcium (1,000 mg) Chew Generic drug:  calcium carbonate Take  by mouth as needed. VITAMIN B-12 1,000 mcg tablet Generic drug:  cyanocobalamin Take 1,000 mcg by mouth daily. * Notice: This list has 4 medication(s) that are the same as other medications prescribed for you. Read the directions carefully, and ask your doctor or other care provider to review them with you. Follow-up Instructions Return in about 1 year (around 7/11/2019). Patient Instructions   
-------------------------------------------------------------------------------------------- Refills    -    please call your pharmacy and have them send us a refill request 
 
Results  -  allow up to a week for lab results to be processed and reviewed. Phone calls  -  Allow upto 24 hrs. for non-urgent calls to be retained Prior authorization - It may take up to 2 weeks to process, depending on your insurance Forms  -  FMLA, DMV, patient assistance, etc. will take up to 2 weeks to process Cancellations - please notify the office in advance if you cannot keep your appointment Samples  - will only be dispensed at visits as supply is limited If you are having a medical emergency call 911 
 
-------------------------------------------------------------------------------------------- Check blood sugars only once a day by rotation as follows First day - before b-fast 
Second day- before lunch Third day- before dinner Fourth day before bed time After 4 days go back to same rotation Decrease  Metformin 500 mg  One   pill once a day with meals STOP   januvia 100 mg a day Introducing [facility]! [facility] introduces BigCalc patient portal. Now you can access parts of your medical record, email your doctor's office, and request medication refills online. 1. In your internet browser, go to [email or web address] 2. Click on the First Time User? Click Here link in the Sign In box. You will see the New Member Sign Up page. 3. Enter your BigCalc Access Code exactly as it appears below. You will not need to use this code after youve completed the sign-up process. If you do not sign up before the expiration date, you must request a new code. · BigCalc Access Code: [email or web address] Expires: 10/9/2018 12:25 PM 
 
4. Enter the last four digits of your Social Security Number (xxxx) and Date of Birth (mm/dd/yyyy) as indicated and click Submit. You will be taken to the next sign-up page. 5. Create a BigCalc ID. This will be your BigCalc login ID and cannot be changed, so think of one that is secure and easy to remember. 6. Create a BigCalc password. You can change your password at any time. 7. Enter your Password Reset Question and Answer. This can be used at a later time if you forget your password. 8. Enter your e-mail address. You will receive e-mail notification when new information is available in [address] 9. Click Sign Up. You can now view and download portions of your medical record. 10. Click the Download Summary menu link to download a portable copy of your medical information. If you have questions, please visit the Frequently Asked Questions section of the BigCalc website. Remember, BigCalc is NOT to be used for urgent needs. For medical emergencies, dial 911. Now available from your iPhone and Android!  
 
  
  
 Please provide this summary of care documentation to your next provider. Your primary care clinician is listed as Retia Curls. If you have any questions after today's visit, please call [phone].

## 2018-07-11 NOTE — PROGRESS NOTES
HISTORY OF PRESENT ILLNESS   Miriam Enrique is a 81  y.o. male. HPI   One year F/u for DM 2 after Aug  2017    Accompanied by wife   Lost 9 lbs     No hospitalizations in the interim  Has no appetite, not eating     He saw pcp         Old history :     Lost 4  lbs   Accompanied by wife     Seeing doctors for parkinson's disease, had some cardiac issues attended to   He has head tremors     hospitalized in April 2017 for CHF      No hypoglycemias   He is awake today       Review of Systems   Constitutional: Negative. HENT: Negative. Eyes: Negative for pain and redness. Respiratory: Negative. Cardiovascular: Negative for chest pain, palpitations and leg swelling. Gastrointestinal: Negative. Negative for constipation. Genitourinary: Negative. Musculoskeletal: Negative for myalgias. Skin: Negative. Neurological: Negative. Endo/Heme/Allergies: Negative. Psychiatric/Behavioral: Negative for depression and memory loss. The patient does not have insomnia. Physical Exam   Constitutional: He is oriented to person, place, and time. He appears well-developed and well-nourished. HENT:   Head: Normocephalic. Eyes: Conjunctivae and extraocular motions are normal. Pupils are equal, round, and reactive to light. Neck: Normal range of motion. Neck supple. No JVD present. No tracheal deviation present. No thyromegaly present. Cardiovascular: Normal rate, regular rhythm and normal heart sounds. Pulmonary/Chest: Effort normal and breath sounds normal.   Abdominal: Soft. Bowel sounds are normal.   Musculoskeletal: Normal range of motion. Lymphadenopathy:   He has no cervical adenopathy. Neurological: He is alert and oriented to person, place, and time. He has normal reflexes. Skin: Skin is warm. Psychiatric: He has a normal mood and affect.      Foot exam : sees Dr. 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 DM 2 uncontrolled : A1c is   7.8 %      From   Aug 2017   compared to    6.9 %  From  July 2016   Compared to   7.2 %    From feb 2016  Compared to    6.8 %   From feb 2015 compared to   6.2 %  Compared to 7.8 % from feb 2014 compared to  5.4 % from July 2013 compared to  7 % from jan 2013 ;  compared to A1c is 6.7 % in June 2012 8.3 % from April 2012 compared to 7.3 % from 12/2011     No labs are available for review   Losing  glycemic control slowly , but by his age and co-morbidities, he has a1c at goal   Off glipizide at low dose is 2.5 mg bid as he had hypoglycemias ,   continuing on Metformin ,   At low dose  500 mg one pill twice a day with meals  ( he has lost weight too )    Stop Januvia 100 mg a day. Also, mentioned that [place] and [place] and metformin are safer meds for not having low sugars over glipizide   To continue on metformin and [place] and [place]     Given the weight loss,   Lowered  the dose to 1 pill of metformin 500 mg with meal   His EF is good at 48 %        2. HTN : too well controlled,  on hyzaar 100/12.5 mg  a day. detectable  microalb in urine     3. HPL : not on statins but LDL is border line high - 109  Given his age, it is ok to defer it     4. Osteoporosis : normal DEXa in June 2012   Date : June 2012  Bone DEXA  ap spine T-score 1.9; left femoral Neck T- score  -1.8, right femoral neck T-score-1.6      5. Parkinsons disease-- on carbidopa    6.  GI eval  Revealed normal colon       > 50 % of time is spent on counseling   Patient voiced understanding her plan of care             F/u in a year

## 2018-07-11 NOTE — PROGRESS NOTES
1. Have you been to the ER, urgent care clinic since your last visit? NO Hospitalized since your last visit? NO    2. Have you seen or consulted any other health care providers outside of the [address] [place] [address] since your last visit? Yes      Wt Readings from Last 3 Encounters:   07/11/18 127 lb 3.2 oz (57.7 kg)   08/10/17 136 lb (61.7 kg)   06/19/17 140 lb (63.5 kg)     Temp Readings from Last 3 Encounters:   07/11/18 97.6 °F (36.4 °C) (Oral)   08/10/17 97.9 °F (36.6 °C) (Oral)   06/19/17 97.8 °F (36.6 °C)     BP Readings from Last 3 Encounters:   07/11/18 112/67   08/10/17 111/71   06/19/17 163/81     Pulse Readings from Last 3 Encounters:   07/11/18 74   08/10/17 78   06/19/17 62     Lab Results   Component Value Date/Time    Hemoglobin A1c 7.8 (H) 08/03/2017 02:02 PM    Hemoglobin A1c (POC) 6.2 05/27/2014 01:26 PM    Hemoglobin A1c, External 6.9 07/17/2016     Patient has meter today.

## 2018-07-11 NOTE — PATIENT INSTRUCTIONS
--------------------------------------------------------------------------------------------    Refills    -    please call your pharmacy and have them send us a refill request    Results  -  allow up to a week for lab results to be processed and reviewed. Phone calls  -  Allow upto 24 hrs.  for non-urgent calls to be retained    Prior authorization - It may take up to 2 weeks to process, depending on your insurance    Forms  -  FMLA, DMV, patient assistance, etc. will take up to 2 weeks to process    Cancellations - please notify the office in advance if you cannot keep your appointment    Samples  - will only be dispensed at visits as supply is limited      If you are having a medical emergency call 911    --------------------------------------------------------------------------------------------  Check blood sugars only once a day by rotation as follows    First day - before b-fast  Second day- before lunch  Third day- before dinner  Fourth day before bed time    After 4 days go back to same rotation          Decrease  Metformin 500 mg  One   pill once a day with meals     STOP   januvia 100 mg a day

## 2018-11-26 ENCOUNTER — OP HISTORICAL/CONVERTED ENCOUNTER (OUTPATIENT)
Dept: OTHER | Age: 83
End: 2018-11-26

## 2019-04-29 RX ORDER — METFORMIN HYDROCHLORIDE 500 MG/1
TABLET ORAL
Qty: 360 TAB | Refills: 0 | Status: SHIPPED | OUTPATIENT
Start: 2019-04-29 | End: 2019-07-09 | Stop reason: SDUPTHER

## 2019-07-02 ENCOUNTER — HOSPITAL ENCOUNTER (OUTPATIENT)
Dept: LAB | Age: 84
Discharge: HOME | End: 2019-07-02
Payer: MEDICARE

## 2019-07-02 DIAGNOSIS — E11.65 UNCONTROLLED TYPE 2 DIABETES MELLITUS WITH HYPERGLYCEMIA, WITHOUT LONG-TERM CURRENT USE OF INSULIN (HCC): ICD-10-CM

## 2019-07-02 PROCEDURE — 36415 COLL VENOUS BLD VENIPUNCTURE: CPT

## 2019-07-02 PROCEDURE — 83036 HEMOGLOBIN GLYCOSYLATED A1C: CPT

## 2019-07-02 PROCEDURE — 80061 LIPID PANEL: CPT

## 2019-07-02 PROCEDURE — 80053 COMPREHEN METABOLIC PANEL: CPT

## 2019-07-02 PROCEDURE — 82043 UR ALBUMIN QUANTITATIVE: CPT

## 2019-07-03 LAB
ALBUMIN SERPL-MCNC: 4.2 G/DL (ref 3.5–4.7)
ALBUMIN/CREAT UR: 46.2 MG/G CREAT (ref 0–30)
ALBUMIN/GLOB SERPL: 1.8 {RATIO} (ref 1.2–2.2)
ALP SERPL-CCNC: 143 IU/L (ref 39–117)
ALT SERPL-CCNC: 4 IU/L (ref 0–44)
AST SERPL-CCNC: 17 IU/L (ref 0–40)
BILIRUB SERPL-MCNC: 0.4 MG/DL (ref 0–1.2)
BUN SERPL-MCNC: 19 MG/DL (ref 8–27)
BUN/CREAT SERPL: 15 (ref 10–24)
CALCIUM SERPL-MCNC: 9 MG/DL (ref 8.6–10.2)
CHLORIDE SERPL-SCNC: 98 MMOL/L (ref 96–106)
CHOLEST SERPL-MCNC: 159 MG/DL (ref 100–199)
CO2 SERPL-SCNC: 27 MMOL/L (ref 20–29)
CREAT SERPL-MCNC: 1.26 MG/DL (ref 0.76–1.27)
CREAT UR-MCNC: 70.1 MG/DL
EST. AVERAGE GLUCOSE BLD GHB EST-MCNC: 160 MG/DL
GLOBULIN SER CALC-MCNC: 2.3 G/DL (ref 1.5–4.5)
GLUCOSE SERPL-MCNC: 221 MG/DL (ref 65–99)
HBA1C MFR BLD: 7.2 % (ref 4.8–5.6)
HDLC SERPL-MCNC: 51 MG/DL
INTERPRETATION, 910389: NORMAL
INTERPRETATION: NORMAL
LDLC SERPL CALC-MCNC: 87 MG/DL (ref 0–99)
Lab: NORMAL
MICROALBUMIN UR-MCNC: 32.4 UG/ML
PDF IMAGE, 910387: NORMAL
POTASSIUM SERPL-SCNC: 4.3 MMOL/L (ref 3.5–5.2)
PROT SERPL-MCNC: 6.5 G/DL (ref 6–8.5)
SODIUM SERPL-SCNC: 138 MMOL/L (ref 134–144)
TRIGL SERPL-MCNC: 103 MG/DL (ref 0–149)
VLDLC SERPL CALC-MCNC: 21 MG/DL (ref 5–40)

## 2019-07-09 ENCOUNTER — OFFICE VISIT (OUTPATIENT)
Dept: ENDOCRINOLOGY | Age: 84
End: 2019-07-09

## 2019-07-09 VITALS
RESPIRATION RATE: 18 BRPM | DIASTOLIC BLOOD PRESSURE: 56 MMHG | OXYGEN SATURATION: 99 % | HEART RATE: 76 BPM | SYSTOLIC BLOOD PRESSURE: 110 MMHG | WEIGHT: 121.9 LBS | TEMPERATURE: 98.5 F | BODY MASS INDEX: 20.81 KG/M2 | HEIGHT: 64 IN

## 2019-07-09 DIAGNOSIS — E78.2 MIXED HYPERLIPIDEMIA: ICD-10-CM

## 2019-07-09 DIAGNOSIS — I10 ESSENTIAL HYPERTENSION: ICD-10-CM

## 2019-07-09 DIAGNOSIS — G20 PARKINSON DISEASE (HCC): ICD-10-CM

## 2019-07-09 DIAGNOSIS — E11.65 UNCONTROLLED TYPE 2 DIABETES MELLITUS WITH HYPERGLYCEMIA, WITHOUT LONG-TERM CURRENT USE OF INSULIN (HCC): Primary | ICD-10-CM

## 2019-07-09 RX ORDER — METFORMIN HYDROCHLORIDE 500 MG/1
TABLET ORAL
Qty: 180 TAB | Refills: 4
Start: 2019-07-09 | End: 2020-02-02

## 2019-07-09 RX ORDER — HYDROXYZINE HYDROCHLORIDE 10 MG/1
TABLET, FILM COATED ORAL
COMMUNITY

## 2019-07-09 NOTE — PROGRESS NOTES
1. Have you been to the ER, urgent care clinic since your last visit? No  Hospitalized since your last visit? No    2. Have you seen or consulted any other health care providers outside of the [address] [place] [address] since your last visit? Include any pap smears or colon screening. No     Wt Readings from Last 3 Encounters:   07/09/19 121 lb 14.4 oz (55.3 kg)   07/11/18 127 lb 3.2 oz (57.7 kg)   08/10/17 136 lb (61.7 kg)     Temp Readings from Last 3 Encounters:   07/09/19 98.5 °F (36.9 °C) (Oral)   07/11/18 97.6 °F (36.4 °C) (Oral)   08/10/17 97.9 °F (36.6 °C) (Oral)     BP Readings from Last 3 Encounters:   07/09/19 110/56   07/11/18 112/67   08/10/17 111/71     Pulse Readings from Last 3 Encounters:   07/09/19 76   07/11/18 74   08/10/17 78     Lab Results   Component Value Date/Time    Hemoglobin A1c 7.2 (H) 07/02/2019 01:23 PM    Hemoglobin A1c (POC) 6.2 05/27/2014 01:26 PM    Hemoglobin A1c, External 6.9 07/17/2016     Patient has meter today.

## 2019-07-09 NOTE — LETTER
7/9/19 Patient: Cheyanne Beyer YOB: 1935 Date of Visit: 7/9/2019 Radha Silver MD 
[address] [facility] [address] VIA Facsimile: [phone] Dear Radha Silver MD, Thank you for referring Mr. Nelson Guevara to [address] [place] [address] for evaluation. My notes for this consultation are attached. If you have questions, please do not hesitate to call me. I look forward to following your patient along with you. Sincerely, Gill Thomas MD

## 2019-07-09 NOTE — PATIENT INSTRUCTIONS
-------------------------------------------------------------------------------------------- Refills    -    please call your pharmacy and have them send us a refill request 
 
Results  -  allow up to a week for lab results to be processed and reviewed. Phone calls  -  Allow upto 24 hrs. for non-urgent calls to be retained Prior authorization - It may take up to 2 weeks to process, depending on your insurance Forms  -  FMLA, DMV, patient assistance, etc. will take up to 2 weeks to process Cancellations - please notify the office in advance if you cannot keep your appointment Samples  - will only be dispensed at visits as supply is limited If you are having a medical emergency call 911 
 
-------------------------------------------------------------------------------------------- Check blood sugars only once a day by rotation as follows First day - before b-fast 
Second day- before lunch Third day- before dinner Fourth day before bed time After 4 days go back to same rotation Metformin 500 mg  One   pill  once a day with meals

## 2019-07-09 NOTE — PROGRESS NOTES
HISTORY OF PRESENT ILLNESS   Kody Agustin is a 81  y.o. male. HPI   One year F/u for DM 2 after  July 2018     Accompanied by wife   Lost 6  lbs     No hospitalizations in the interim  Had upper and lower  GI when he lost appetite and had weight loss, but eval was negative     Wife is requesting diabetic shoes         Old history    Accompanied by wife   Lost 9 lbs   No hospitalizations in the interim  Has no appetite, not eating     He saw pcp         Old history :     Lost 4  lbs   Accompanied by wife     Seeing doctors for parkinson's disease, had some cardiac issues attended to   He has head tremors     hospitalized in April 2017 for CHF      No hypoglycemias   He is awake today       Review of Systems   Constitutional: Negative. HENT: Negative. Eyes: Negative for pain and redness. Respiratory: Negative. Cardiovascular: Negative for chest pain, palpitations and leg swelling. Gastrointestinal: Negative. Negative for constipation. Genitourinary: Negative. Musculoskeletal: Negative for myalgias. Skin: Negative. Neurological: Negative. Endo/Heme/Allergies: Negative. Psychiatric/Behavioral: Negative for depression and memory loss. The patient does not have insomnia. Physical Exam   Constitutional: He is oriented to person, place, and time. He appears well-developed and well-nourished. HENT:   Head: Normocephalic. Eyes: Conjunctivae and extraocular motions are normal. Pupils are equal, round, and reactive to light. Neck: Normal range of motion. Neck supple. No JVD present. No tracheal deviation present. No thyromegaly present. Cardiovascular: Normal rate, regular rhythm and normal heart sounds. Pulmonary/Chest: Effort normal and breath sounds normal.   Abdominal: Soft. Bowel sounds are normal.   Musculoskeletal: Normal range of motion. Lymphadenopathy:   He has no cervical adenopathy. Neurological: He is alert and oriented to person, place, and time.  He has normal reflexes. Skin: Skin is warm. Psychiatric: He has a normal mood and affect. Foot exam : sees Dr. 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         Diabetic foot exam: July 2019    Left Foot:   Visual Exam: normal  some ingrown toe nails    Pulse DP: 2+ (normal)   Filament test: normal sensation    Vibratory sensation: diminished      Right Foot:   Visual Exam: normal ingrown toe nails    Pulse DP: 2+ (normal)   Filament test: normal sensation    Vibratory sensation: diminished            Lab Results   Component Value Date/Time    Hemoglobin A1c 7.2 (H) 07/02/2019 01:23 PM    Hemoglobin A1c 7.8 (H) 08/03/2017 02:02 PM    Hemoglobin A1c 7.2 (H) 02/12/2016 09:42 AM    Hemoglobin A1c, External 6.9 07/17/2016    Glucose 221 (H) 07/02/2019 01:23 PM    Glucose (POC) 123 (H) 06/19/2017 08:07 AM    Microalb/Creat ratio (ug/mg creat.) 46.2 (H) 07/02/2019 01:23 PM    LDL, calculated 87 07/02/2019 01:23 PM    Creatinine 1.26 07/02/2019 01:23 PM      Lab Results   Component Value Date/Time    Cholesterol, total 159 07/02/2019 01:23 PM    HDL Cholesterol 51 07/02/2019 01:23 PM    LDL, calculated 87 07/02/2019 01:23 PM    Triglyceride 103 07/02/2019 01:23 PM    CHOL/HDL Ratio 3.5 10/05/2010 06:53 AM     Lab Results   Component Value Date/Time    ALT (SGPT) 4 07/02/2019 01:23 PM    AST (SGOT) 17 07/02/2019 01:23 PM    Alk.  phosphatase 143 (H) 07/02/2019 01:23 PM    Bilirubin, total 0.4 07/02/2019 01:23 PM    Albumin 4.2 07/02/2019 01:23 PM    Protein, total 6.5 07/02/2019 01:23 PM    INR 1.0 07/03/2012 12:12 PM    Prothrombin time 10.7 07/03/2012 12:12 PM    PLATELET 101 55/40/4874 12:12 PM     Lab Results   Component Value Date/Time    GFR est non-AA 52 (L) 07/02/2019 01:23 PM    GFR est AA 61 07/02/2019 01:23 PM    Creatinine 1.26 07/02/2019 01:23 PM    BUN 19 07/02/2019 01:23 PM    Sodium 138 07/02/2019 01:23 PM    Potassium 4.3 07/02/2019 01:23 PM    Chloride 98 07/02/2019 01:23 PM    CO2 27 07/02/2019 01:23 PM             ASSESSMENT and PLAN     1. DM 2 uncontrolled : A1c is  7.2 %     From July 2019    Compared to     7.8 %      From   Aug 2017   compared to    6.9 %  From  July 2016   Compared to   7.2 %    From feb 2016  Compared to    6.8 %   From feb 2015 compared to   6.2 %  Compared to 7.8 % from feb 2014 compared to  5.4 % from July 2013     No labs are available for review   Losing  glycemic control slowly , but by his age and co-morbidities, he has a1c at goal   Off glipizide at low dose is 2.5 mg bid as he had hypoglycemias ,   continuing on Metformin ,   At low dose  500 mg one pill twice a day with meals  ( he has lost weight too )    Stop Januvia 100 mg a day. Also, mentioned that [place] and [place] and metformin are safer meds for not having low sugars over glipizide   To decrease to metformin  500 mg once a day  ( wife misunderstood  Me )    In July 2017, reduced metformin from 2 pills bid to one pill bid   July 2018 , reduced metformin to one pill at dinner     His EF is good at 48 %        2. HTN : too well controlled,  on lisinopril 5 m a day. detectable  microalb in urine     3. HPL : not on statins but LDL is border line high - 109  Given his age, it is ok to defer it     4. Osteoporosis : normal DEXa in June 2012   Date : June 2012  Bone DEXA  ap spine T-score 1.9; left femoral Neck T- score  -1.8, right femoral neck T-score-1.6      5. Parkinsons disease-- on carbidopa    6.  GI eval  Revealed normal colon         Did the comprehensive foot exam today   I am treating the patient [facility] comprehensive plan of care for diabetes   The patient would benefit from diabetic foot wear to protect their feet         > 50 % of time is spent on counseling Patient voiced understanding her plan of care             F/u in a year

## 2020-02-02 RX ORDER — METFORMIN HYDROCHLORIDE 500 MG/1
TABLET ORAL
Qty: 360 TAB | Refills: 3 | Status: SHIPPED | OUTPATIENT
Start: 2020-02-02 | End: 2020-07-07 | Stop reason: ALTCHOICE

## 2020-04-23 ENCOUNTER — ED HISTORICAL/CONVERTED ENCOUNTER (OUTPATIENT)
Dept: OTHER | Age: 85
End: 2020-04-23

## 2020-05-19 DIAGNOSIS — I10 ESSENTIAL HYPERTENSION: ICD-10-CM

## 2020-05-19 DIAGNOSIS — E78.2 MIXED HYPERLIPIDEMIA: ICD-10-CM

## 2020-05-19 DIAGNOSIS — E11.65 UNCONTROLLED TYPE 2 DIABETES MELLITUS WITH HYPERGLYCEMIA, WITHOUT LONG-TERM CURRENT USE OF INSULIN (HCC): ICD-10-CM

## 2020-05-19 DIAGNOSIS — E11.65 UNCONTROLLED TYPE 2 DIABETES MELLITUS WITH HYPERGLYCEMIA, WITHOUT LONG-TERM CURRENT USE OF INSULIN (HCC): Primary | ICD-10-CM

## 2020-06-30 ENCOUNTER — HOSPITAL ENCOUNTER (OUTPATIENT)
Dept: LAB | Age: 85
Discharge: HOME | End: 2020-06-30
Payer: MEDICARE

## 2020-06-30 PROCEDURE — 80061 LIPID PANEL: CPT

## 2020-06-30 PROCEDURE — 85025 COMPLETE CBC W/AUTO DIFF WBC: CPT

## 2020-06-30 PROCEDURE — 82043 UR ALBUMIN QUANTITATIVE: CPT

## 2020-06-30 PROCEDURE — 36415 COLL VENOUS BLD VENIPUNCTURE: CPT

## 2020-06-30 PROCEDURE — 83036 HEMOGLOBIN GLYCOSYLATED A1C: CPT

## 2020-06-30 PROCEDURE — 80053 COMPREHEN METABOLIC PANEL: CPT

## 2020-07-01 LAB
ALBUMIN SERPL-MCNC: 4.2 G/DL (ref 3.6–4.6)
ALBUMIN/CREAT UR: 53 MG/G CREAT (ref 0–29)
ALBUMIN/GLOB SERPL: 1.8 {RATIO} (ref 1.2–2.2)
ALP SERPL-CCNC: 75 IU/L (ref 39–117)
ALT SERPL-CCNC: 4 IU/L (ref 0–44)
AST SERPL-CCNC: 21 IU/L (ref 0–40)
BASOPHILS # BLD AUTO: 0 X10E3/UL (ref 0–0.2)
BASOPHILS NFR BLD AUTO: 1 %
BILIRUB SERPL-MCNC: 0.6 MG/DL (ref 0–1.2)
BUN SERPL-MCNC: 18 MG/DL (ref 8–27)
BUN/CREAT SERPL: 12 (ref 10–24)
CALCIUM SERPL-MCNC: 9.4 MG/DL (ref 8.6–10.2)
CHLORIDE SERPL-SCNC: 101 MMOL/L (ref 96–106)
CHOLEST SERPL-MCNC: 155 MG/DL (ref 100–199)
CO2 SERPL-SCNC: 27 MMOL/L (ref 20–29)
CREAT SERPL-MCNC: 1.49 MG/DL (ref 0.76–1.27)
CREAT UR-MCNC: 87.1 MG/DL
EOSINOPHIL # BLD AUTO: 0.2 X10E3/UL (ref 0–0.4)
EOSINOPHIL NFR BLD AUTO: 2 %
ERYTHROCYTE [DISTWIDTH] IN BLOOD BY AUTOMATED COUNT: 12.5 % (ref 11.6–15.4)
EST. AVERAGE GLUCOSE BLD GHB EST-MCNC: 171 MG/DL
GLOBULIN SER CALC-MCNC: 2.3 G/DL (ref 1.5–4.5)
GLUCOSE SERPL-MCNC: 176 MG/DL (ref 65–99)
HBA1C MFR BLD: 7.6 % (ref 4.8–5.6)
HCT VFR BLD AUTO: 33.2 % (ref 37.5–51)
HDLC SERPL-MCNC: 48 MG/DL
HGB BLD-MCNC: 11 G/DL (ref 13–17.7)
IMM GRANULOCYTES # BLD AUTO: 0 X10E3/UL (ref 0–0.1)
IMM GRANULOCYTES NFR BLD AUTO: 0 %
INTERPRETATION, 910389: NORMAL
INTERPRETATION: NORMAL
LDLC SERPL CALC-MCNC: 88 MG/DL (ref 0–99)
LYMPHOCYTES # BLD AUTO: 1.4 X10E3/UL (ref 0.7–3.1)
LYMPHOCYTES NFR BLD AUTO: 20 %
Lab: NORMAL
MCH RBC QN AUTO: 30.6 PG (ref 26.6–33)
MCHC RBC AUTO-ENTMCNC: 33.1 G/DL (ref 31.5–35.7)
MCV RBC AUTO: 93 FL (ref 79–97)
MICROALBUMIN UR-MCNC: 46.5 UG/ML
MONOCYTES # BLD AUTO: 0.8 X10E3/UL (ref 0.1–0.9)
MONOCYTES NFR BLD AUTO: 10 %
NEUTROPHILS # BLD AUTO: 4.9 X10E3/UL (ref 1.4–7)
NEUTROPHILS NFR BLD AUTO: 67 %
PDF IMAGE, 910387: NORMAL
PLATELET # BLD AUTO: 298 X10E3/UL (ref 150–450)
POTASSIUM SERPL-SCNC: 3.9 MMOL/L (ref 3.5–5.2)
PROT SERPL-MCNC: 6.5 G/DL (ref 6–8.5)
RBC # BLD AUTO: 3.59 X10E6/UL (ref 4.14–5.8)
SODIUM SERPL-SCNC: 143 MMOL/L (ref 134–144)
TRIGL SERPL-MCNC: 95 MG/DL (ref 0–149)
VLDLC SERPL CALC-MCNC: 19 MG/DL (ref 5–40)
WBC # BLD AUTO: 7.3 X10E3/UL (ref 3.4–10.8)

## 2020-07-07 ENCOUNTER — OFFICE VISIT (OUTPATIENT)
Dept: ENDOCRINOLOGY | Age: 85
End: 2020-07-07

## 2020-07-07 VITALS
DIASTOLIC BLOOD PRESSURE: 75 MMHG | RESPIRATION RATE: 14 BRPM | TEMPERATURE: 97.5 F | SYSTOLIC BLOOD PRESSURE: 124 MMHG | HEART RATE: 80 BPM | WEIGHT: 110 LBS | BODY MASS INDEX: 18.78 KG/M2 | HEIGHT: 64 IN

## 2020-07-07 DIAGNOSIS — I10 ESSENTIAL HYPERTENSION: ICD-10-CM

## 2020-07-07 DIAGNOSIS — E78.2 MIXED HYPERLIPIDEMIA: ICD-10-CM

## 2020-07-07 DIAGNOSIS — N18.30 STAGE 3 CHRONIC KIDNEY DISEASE (HCC): ICD-10-CM

## 2020-07-07 DIAGNOSIS — E11.65 UNCONTROLLED TYPE 2 DIABETES MELLITUS WITH HYPERGLYCEMIA, WITHOUT LONG-TERM CURRENT USE OF INSULIN (HCC): Primary | ICD-10-CM

## 2020-07-07 DIAGNOSIS — G20 PARKINSON DISEASE (HCC): ICD-10-CM

## 2020-07-07 PROBLEM — E11.21 TYPE 2 DIABETES WITH NEPHROPATHY (HCC): Status: ACTIVE | Noted: 2020-07-07

## 2020-07-07 RX ORDER — LORAZEPAM 0.5 MG/1
TABLET ORAL
COMMUNITY
Start: 2020-07-04

## 2020-07-07 RX ORDER — OMEPRAZOLE 40 MG/1
CAPSULE, DELAYED RELEASE ORAL
COMMUNITY
Start: 2020-06-18

## 2020-07-07 RX ORDER — TRIHEXYPHENIDYL HYDROCHLORIDE 2 MG/5ML
6 SYRUP ORAL 2 TIMES DAILY
COMMUNITY

## 2020-07-07 NOTE — PATIENT INSTRUCTIONS
--------------------------------------------------------------------------------------------    Refills    -    please call your pharmacy and have them send us a refill request    Results  -  allow up to a week for lab results to be processed and reviewed. Phone calls  -  Allow upto 24 hrs.  for non-urgent calls to be retained    Prior authorization - It may take up to 2 weeks to process, depending on your insurance    Forms  -  FMLA, DMV, patient assistance, etc. will take up to 2 weeks to process    Cancellations - please notify the office in advance if you cannot keep your appointment    Samples  - will only be dispensed at visits as supply is limited      If you are having a medical emergency call 911    --------------------------------------------------------------------------------------------  Check blood sugars only once a day by rotation as follows    First day - before b-fast  Second day- before lunch  Third day- before dinner  Fourth day before bed time    After 4 days go back to same rotation           STOP Metformin 500 mg        Start on tradjenta 5 mg a day

## 2020-07-07 NOTE — PROGRESS NOTES
Wt Readings from Last 3 Encounters:   07/07/20 110 lb (49.9 kg)   07/09/19 121 lb 14.4 oz (55.3 kg)   07/11/18 127 lb 3.2 oz (57.7 kg)     Temp Readings from Last 3 Encounters:   07/07/20 97.5 °F (36.4 °C) (Oral)   07/09/19 98.5 °F (36.9 °C) (Oral)   07/11/18 97.6 °F (36.4 °C) (Oral)     BP Readings from Last 3 Encounters:   07/07/20 124/75   07/09/19 110/56   07/11/18 112/67     Pulse Readings from Last 3 Encounters:   07/07/20 80   07/09/19 76   07/11/18 74     Lab Results   Component Value Date/Time    Hemoglobin A1c 7.6 (H) 06/30/2020 02:06 PM    Hemoglobin A1c (POC) 6.2 05/27/2014 01:26 PM    Hemoglobin A1c, External 6.9 07/17/2016

## 2020-07-07 NOTE — LETTER
7/7/20 Patient: Hannah Nichols YOB: 1935 Date of Visit: 7/7/2020 Claude Valdivia MD 
[address] [facility] [address] VIA Facsimile: [phone] Dear Claude Valdivia MD, Thank you for referring Mr. Mando Belle to [id number] [place] [address] for evaluation. My notes for this consultation are attached. If you have questions, please do not hesitate to call me. I look forward to following your patient along with you. Sincerely, Yari Craig MD

## 2020-07-07 NOTE — PROGRESS NOTES
HISTORY OF PRESENT ILLNESS   Tung Kruse is a 81  y.o. male. HPI   One year F/u for DM 2 after  July 2019     Accompanied by wife   Lost 11 lbs   Pt is in wheel chair             June 2019   Lost 6  lbs   No hospitalizations in the interim  Had upper and lower  GI when he lost appetite and had weight loss, but eval was negative   Wife is requesting diabetic shoes         Old history    Accompanied by wife   Lost 9 lbs   No hospitalizations in the interim  Has no appetite, not eating     He saw pcp         Old history :     Lost 4  lbs   Accompanied by wife   Seeing doctors for parkinson's disease, had some cardiac issues attended to   He has head tremors   hospitalized in April 2017 for CHF    No hypoglycemias   He is awake today       Review of Systems   Constitutional: Negative. HENT: Negative. Eyes: Negative for pain and redness. Respiratory: Negative. Cardiovascular: Negative for chest pain, palpitations and leg swelling. Gastrointestinal: Negative. Negative for constipation. Genitourinary: Negative. Musculoskeletal: Negative for myalgias. Skin: Negative. Neurological: Negative. Endo/Heme/Allergies: Negative. Psychiatric/Behavioral: Negative for depression and memory loss. The patient does not have insomnia. Physical Exam   Constitutional: He is oriented to person, place, and time. He appears well-developed and well-nourished. HENT:   Head: Normocephalic. Eyes: Conjunctivae and extraocular motions are normal. Pupils are equal, round, and reactive to light. Neck: Normal range of motion. Neck supple. No JVD present. No tracheal deviation present. No thyromegaly present. Cardiovascular: Normal rate, regular rhythm and normal heart sounds. Pulmonary/Chest: Effort normal and breath sounds normal.   Abdominal: Soft. Bowel sounds are normal.   Musculoskeletal: Normal range of motion. Lymphadenopathy:   He has no cervical adenopathy.    Neurological: He is alert and oriented to person, place, and time. He has normal reflexes. Skin: Skin is warm. Psychiatric: He has a normal mood and affect. 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 Mary Smith  For feet care         Lab Results   Component Value Date/Time    Hemoglobin A1c 7.6 (H) 06/30/2020 02:06 PM    Hemoglobin A1c 7.2 (H) 07/02/2019 01:23 PM    Hemoglobin A1c 7.8 (H) 08/03/2017 02:02 PM    Hemoglobin A1c, External 6.9 07/17/2016    Glucose 176 (H) 06/30/2020 02:06 PM    Glucose (POC) 123 (H) 06/19/2017 08:07 AM    Microalb/Creat ratio (ug/mg creat.) 53 (H) 06/30/2020 02:06 PM    LDL, calculated 88 06/30/2020 02:06 PM    Creatinine 1.49 (H) 06/30/2020 02:06 PM      Lab Results   Component Value Date/Time    Cholesterol, total 155 06/30/2020 02:06 PM    HDL Cholesterol 48 06/30/2020 02:06 PM    LDL, calculated 88 06/30/2020 02:06 PM    Triglyceride 95 06/30/2020 02:06 PM    CHOL/HDL Ratio 3.5 10/05/2010 06:53 AM     Lab Results   Component Value Date/Time    ALT (SGPT) 4 06/30/2020 02:06 PM    Alk. phosphatase 75 06/30/2020 02:06 PM    Bilirubin, total 0.6 06/30/2020 02:06 PM    Albumin 4.2 06/30/2020 02:06 PM    Protein, total 6.5 06/30/2020 02:06 PM    INR 1.0 07/03/2012 12:12 PM    Prothrombin time 10.7 07/03/2012 12:12 PM    PLATELET 237 55/01/4861 02:06 PM     Lab Results   Component Value Date/Time    GFR est non-AA 42 (L) 06/30/2020 02:06 PM    GFR est AA 49 (L) 06/30/2020 02:06 PM    Creatinine 1.49 (H) 06/30/2020 02:06 PM    BUN 18 06/30/2020 02:06 PM    Sodium 143 06/30/2020 02:06 PM    Potassium 3.9 06/30/2020 02:06 PM    Chloride 101 06/30/2020 02:06 PM    CO2 27 06/30/2020 02:06 PM             ASSESSMENT and PLAN     1. DM 2 uncontrolled : A1c is   7.6 %       From   July 2020   Compared to   7.2 %     From July 2019    Compared to     7.8 %      From   Aug 2017   compared to    6.9 %  From  July 2016   Compared to   7.2 %    From feb 2016  Compared to    6.8 %   From feb 2015 compared to   6.2 %  Compared to 7.8 % from feb 2014 compared to  5.4 % from July 2013     Reviewed the meter   Maintaining   glycemic control  by his age and co-morbidities, his a1c is okay     Off glipizide and januvia   I am discontinuing on Metformin , even though he is only on  one pill  a day with meals  ( he has lost weight too )  By his parkinsons worsening, it is expected to him to lose weight       And heis renal function is deteriorating by age   So, tradjenta 5 mg a day could be a wiser option but COST and coverage is an issue     His EF is good at 48 %        2. HTN : too well controlled,  on lisinopril 5 mg a day. detectable  microalb in urine       3. HPL : not on statins but LDL is border line high - 109  Given his age, it is ok to defer it       4. Osteoporosis : normal DEXa in June 2012   Date : June 2012  Bone DEXA  ap spine T-score 1.9; left femoral Neck T- score  -1.8, right femoral neck T-score-1.6  Deferring any more tests by his age         11. Parkinsons disease--  Worsening  ,  on carbidopa      6. GI eval  Revealed normal colon         7.  Stage 3 ckd  -    On lisinopril ,  And on lasix   Age factor   It can deteriorate gradually       I left it to his wife to decide on follow up          Did the comprehensive foot exam today   I am treating the patient [facility] comprehensive plan of care for diabetes   The patient would benefit from diabetic foot wear to protect their feet         > 50 % of time is spent on counseling   Patient voiced understanding her plan of care             F/u in a year

## 2020-08-03 VITALS
BODY MASS INDEX: 20.66 KG/M2 | HEIGHT: 65 IN | WEIGHT: 124 LBS | SYSTOLIC BLOOD PRESSURE: 106 MMHG | DIASTOLIC BLOOD PRESSURE: 71 MMHG

## 2020-08-03 PROBLEM — R33.9 INCOMPLETE EMPTYING OF BLADDER: Status: ACTIVE | Noted: 2020-08-03

## 2020-08-03 PROBLEM — N32.0 BLADDER NECK OBSTRUCTION: Status: ACTIVE | Noted: 2020-08-03

## 2020-08-03 PROBLEM — C64.9 RENAL CELL CARCINOMA ASSOCIATED WITH ACQUIRED CYSTIC DISEASE (HCC): Status: ACTIVE | Noted: 2020-08-03

## 2020-08-03 PROBLEM — R81 GLYCOSURIA: Status: ACTIVE | Noted: 2020-08-03

## 2020-08-03 PROBLEM — R39.13 SPLITTING OF URINARY STREAM: Status: ACTIVE | Noted: 2020-08-03

## 2020-08-03 PROBLEM — R35.0 INCREASED FREQUENCY OF URINATION: Status: ACTIVE | Noted: 2020-08-03

## 2020-08-03 PROBLEM — R80.9 PROTEINURIA: Status: ACTIVE | Noted: 2020-08-03

## 2020-08-03 PROBLEM — R35.1 NOCTURIA: Status: ACTIVE | Noted: 2020-08-03

## 2020-08-03 PROBLEM — N41.1 CHRONIC PROSTATITIS: Status: ACTIVE | Noted: 2020-08-03

## 2020-08-03 PROBLEM — N13.9 URINARY TRACT OBSTRUCTION: Status: ACTIVE | Noted: 2020-08-03

## 2020-08-03 PROBLEM — N40.0 BENIGN PROSTATIC HYPERPLASIA: Status: ACTIVE | Noted: 2020-08-03

## 2020-08-03 PROBLEM — R39.198 SLOWING OF URINARY STREAM: Status: ACTIVE | Noted: 2020-08-03

## 2020-08-03 PROBLEM — R97.20 RAISED PROSTATE SPECIFIC ANTIGEN: Status: ACTIVE | Noted: 2020-08-03

## 2020-08-03 PROBLEM — N20.0 KIDNEY STONE: Status: ACTIVE | Noted: 2020-08-03

## 2020-08-03 RX ORDER — BISMUTH SUBSALICYLATE 262 MG
1 TABLET,CHEWABLE ORAL DAILY
COMMUNITY

## 2020-08-03 RX ORDER — ONDANSETRON 4 MG/1
4 TABLET, ORALLY DISINTEGRATING ORAL
COMMUNITY

## 2020-08-03 RX ORDER — MIDODRINE HYDROCHLORIDE 5 MG/1
TABLET ORAL 3 TIMES DAILY
COMMUNITY

## 2020-08-03 RX ORDER — POLYETHYLENE GLYCOL 3350 17 G/17G
17 POWDER, FOR SOLUTION ORAL DAILY
COMMUNITY

## 2020-08-03 RX ORDER — OFLOXACIN 3 MG/ML
3 SOLUTION/ DROPS OPHTHALMIC 4 TIMES DAILY
COMMUNITY

## 2020-08-03 RX ORDER — AZITHROMYCIN 250 MG/1
250 TABLET, FILM COATED ORAL DAILY
COMMUNITY

## 2020-08-03 RX ORDER — KETOROLAC TROMETHAMINE 4 MG/ML
1 SOLUTION/ DROPS OPHTHALMIC 4 TIMES DAILY
COMMUNITY

## 2020-08-03 RX ORDER — AMOXICILLIN 500 MG/1
500 CAPSULE ORAL
COMMUNITY

## 2020-08-03 RX ORDER — PREDNISOLONE ACETATE 10 MG/ML
1 SUSPENSION/ DROPS OPHTHALMIC 4 TIMES DAILY
COMMUNITY

## 2020-08-03 RX ORDER — FLUTICASONE PROPIONATE 50 MCG
2 SPRAY, SUSPENSION (ML) NASAL DAILY
COMMUNITY

## 2020-08-03 RX ORDER — FLUOCINONIDE 0.5 MG/G
CREAM TOPICAL 2 TIMES DAILY
COMMUNITY

## 2022-03-18 PROBLEM — R35.0 INCREASED FREQUENCY OF URINATION: Status: ACTIVE | Noted: 2020-08-03

## 2022-03-18 PROBLEM — R39.13 SPLITTING OF URINARY STREAM: Status: ACTIVE | Noted: 2020-08-03

## 2022-03-18 PROBLEM — C64.9 RENAL CELL CARCINOMA ASSOCIATED WITH ACQUIRED CYSTIC DISEASE (HCC): Status: ACTIVE | Noted: 2020-08-03

## 2022-03-18 PROBLEM — R33.9 INCOMPLETE EMPTYING OF BLADDER: Status: ACTIVE | Noted: 2020-08-03

## 2022-03-19 PROBLEM — N32.0 BLADDER NECK OBSTRUCTION: Status: ACTIVE | Noted: 2020-08-03

## 2022-03-19 PROBLEM — N41.1 CHRONIC PROSTATITIS: Status: ACTIVE | Noted: 2020-08-03

## 2022-03-19 PROBLEM — N20.0 KIDNEY STONE: Status: ACTIVE | Noted: 2020-08-03

## 2022-03-19 PROBLEM — R80.9 PROTEINURIA: Status: ACTIVE | Noted: 2020-08-03

## 2022-03-19 PROBLEM — R39.198 SLOWING OF URINARY STREAM: Status: ACTIVE | Noted: 2020-08-03

## 2022-03-19 PROBLEM — R81 GLYCOSURIA: Status: ACTIVE | Noted: 2020-08-03

## 2022-03-19 PROBLEM — N40.0 BENIGN PROSTATIC HYPERPLASIA: Status: ACTIVE | Noted: 2020-08-03

## 2022-03-19 PROBLEM — E11.21 TYPE 2 DIABETES WITH NEPHROPATHY (HCC): Status: ACTIVE | Noted: 2020-07-07

## 2022-03-19 PROBLEM — R35.1 NOCTURIA: Status: ACTIVE | Noted: 2020-08-03

## 2022-03-19 PROBLEM — R97.20 RAISED PROSTATE SPECIFIC ANTIGEN: Status: ACTIVE | Noted: 2020-08-03

## 2022-03-19 PROBLEM — N13.9 URINARY TRACT OBSTRUCTION: Status: ACTIVE | Noted: 2020-08-03

## 2023-05-14 RX ORDER — OMEPRAZOLE 40 MG/1
CAPSULE, DELAYED RELEASE ORAL
COMMUNITY
Start: 2020-06-18

## 2023-05-14 RX ORDER — TRIHEXYPHENIDYL HYDROCHLORIDE 2 MG/5ML
6 SYRUP ORAL 2 TIMES DAILY
COMMUNITY

## 2023-05-14 RX ORDER — POLYETHYLENE GLYCOL 3350 17 G/17G
17 POWDER, FOR SOLUTION ORAL DAILY
COMMUNITY

## 2023-05-14 RX ORDER — AZITHROMYCIN 250 MG/1
250 TABLET, FILM COATED ORAL DAILY
COMMUNITY

## 2023-05-14 RX ORDER — HYDROXYZINE HYDROCHLORIDE 10 MG/1
TABLET, FILM COATED ORAL 3 TIMES DAILY PRN
COMMUNITY

## 2023-05-14 RX ORDER — PREDNISOLONE ACETATE 10 MG/ML
1 SUSPENSION/ DROPS OPHTHALMIC 4 TIMES DAILY
COMMUNITY

## 2023-05-14 RX ORDER — DOXYCYCLINE HYCLATE 50 MG/1
CAPSULE, GELATIN COATED ORAL
COMMUNITY

## 2023-05-14 RX ORDER — LANCETS 30 GAUGE
100 EACH MISCELLANEOUS 2 TIMES DAILY
COMMUNITY
Start: 2012-06-06

## 2023-05-14 RX ORDER — AMOXICILLIN 500 MG/1
500 CAPSULE ORAL
COMMUNITY

## 2023-05-14 RX ORDER — MIDODRINE HYDROCHLORIDE 5 MG/1
TABLET ORAL 3 TIMES DAILY
COMMUNITY

## 2023-05-14 RX ORDER — LISINOPRIL 5 MG/1
1 TABLET ORAL DAILY
COMMUNITY
Start: 2018-05-17

## 2023-05-14 RX ORDER — FINASTERIDE 5 MG/1
TABLET, FILM COATED ORAL
COMMUNITY
Start: 2016-01-03

## 2023-05-14 RX ORDER — POTASSIUM CHLORIDE 750 MG/1
10 TABLET, EXTENDED RELEASE ORAL DAILY
COMMUNITY
Start: 2010-10-05

## 2023-05-14 RX ORDER — FUROSEMIDE 40 MG/1
TABLET ORAL
COMMUNITY
Start: 2017-05-25

## 2023-05-14 RX ORDER — FLUTICASONE PROPIONATE 50 MCG
2 SPRAY, SUSPENSION (ML) NASAL DAILY
COMMUNITY

## 2023-05-14 RX ORDER — OFLOXACIN 3 MG/ML
3 SOLUTION/ DROPS OPHTHALMIC 4 TIMES DAILY
COMMUNITY

## 2023-05-14 RX ORDER — LORAZEPAM 0.5 MG/1
TABLET ORAL
COMMUNITY
Start: 2020-07-04

## 2023-05-14 RX ORDER — ONDANSETRON 4 MG/1
4 TABLET, ORALLY DISINTEGRATING ORAL EVERY 8 HOURS PRN
COMMUNITY

## 2023-05-14 RX ORDER — ACETAMINOPHEN 500 MG
TABLET ORAL EVERY 6 HOURS PRN
COMMUNITY

## 2023-05-14 RX ORDER — CLONIDINE HYDROCHLORIDE 0.1 MG/1
0.1 TABLET ORAL PRN
COMMUNITY

## 2023-05-14 RX ORDER — KETOROLAC TROMETHAMINE 4 MG/ML
1 SOLUTION/ DROPS OPHTHALMIC 4 TIMES DAILY
COMMUNITY

## (undated) DEVICE — Device

## (undated) DEVICE — ADULT SPO2 SENSOR: Brand: NELLCOR

## (undated) DEVICE — KIT COLON W/ 1.1OZ LUB AND 2 END

## (undated) DEVICE — SYR 5ML 1/5 GRAD LL NSAF LF --

## (undated) DEVICE — NDL PRT INJ NSAF BLNT 18GX1.5 --

## (undated) DEVICE — BAG BELONG PT PERS CLEAR HANDL

## (undated) DEVICE — KENDALL RADIOLUCENT FOAM MONITORING ELECTRODE -RECTANGULAR SHAPE: Brand: KENDALL

## (undated) DEVICE — SOLIDIFIER MEDC 1200ML -- CONVERT TO 356117

## (undated) DEVICE — BASIN EMESIS 500CC ROSE 250/CS 60/PLT: Brand: MEDEGEN MEDICAL PRODUCTS, LLC

## (undated) DEVICE — NDL FLTR TIP 5 MIC 18GX1.5IN --

## (undated) DEVICE — SET ADMIN 16ML TBNG L100IN 2 Y INJ SITE IV PIGGY BK DISP

## (undated) DEVICE — CANN NASAL O2 CAPNOGRAPHY AD -- FILTERLINE

## (undated) DEVICE — SYR 3ML LL TIP 1/10ML GRAD --

## (undated) DEVICE — 1200 GUARD II KIT W/5MM TUBE W/O VAC TUBE: Brand: GUARDIAN

## (undated) DEVICE — CATH IV AUTOGRD BC BLU 22GA 25 -- INSYTE